# Patient Record
(demographics unavailable — no encounter records)

---

## 2024-10-07 NOTE — CONSULT LETTER
[Dear  ___] : Dear  [unfilled], [Consult Letter:] : I had the pleasure of evaluating your patient, [unfilled]. [Please see my note below.] : Please see my note below. [Sincerely,] : Sincerely, [FreeTextEntry3] :   Shiv Xiong MD, SUSAN, FACS Director of Surgical Oncology- [facility] , Department of Surgery [facility] at [place] [id number] Ph: [phone] Cell: [phone] [DrJu  ___] : Dr. ALVARADO

## 2024-10-07 NOTE — REASON FOR VISIT
[Follow-Up Visit] : a follow-up visit for [Initial Consultation] : an initial consultation for [FreeTextEntry2] : Gallbladder mass

## 2024-10-07 NOTE — PHYSICAL EXAM
[FreeTextEntry1] :   COVID -19 precautions as per [facility] policy was universally followed. [Normal] : supple, no neck mass and thyroid not enlarged [Normal Neck Lymph Nodes] : normal neck lymph nodes  [Normal Supraclavicular Lymph Nodes] : normal supraclavicular lymph nodes [Normal Groin Lymph Nodes] : normal groin lymph nodes [Normal Axillary Lymph Nodes] : normal axillary lymph nodes [Normal] : oriented to person, place and time, with appropriate affect [de-identified] : Abdomen soft, nontender, nondistended, no palpable masses.

## 2024-10-07 NOTE — ASSESSMENT
[FreeTextEntry1] : 83y/o female referred by Dr. Blood, with concerns of new lesion abutting to the gallbladder and ascending colon seen on PET scan. S/P EGD/EUS- Unable to visualize mass seen on PET endosonographically. Cholelithiasis with stones measuring up to 1.5 cm and shadowing artifact. Esophageal candidiasis and small polyp on preceding EGD, biopsied. Path-   Acute esophagitis with fungal organisms consistent with Candida -   PAS stain for fungi is positive. -   Negative for intestinal metaplasia  PET 8/26/24- ([facility])1.  A hypermetabolic soft tissue lesion abutting to the gallbladder and ascending colon with intense FDG avidity is indeterminate, suspicious for a malignant neoplastic process. Suggest contrast CT or MRI for further evaluation. 2.  Diffuse linear activity along the esophagus from upper to midportion without soft tissue mass or esophageal dilatation is probably due to an inflammatory etiology. For clinical correlation. 3.  Extremely dilated gallbladder with thickened wall and gallstone.   Plan:    I have discussed the diagnosis, therapeutic plan and options with the patient at length. Patient expressed verbal understanding to proceed with proposed plan. All questions answered.

## 2024-10-07 NOTE — HISTORY OF PRESENT ILLNESS
[de-identified] : 83y/o female referred by Dr. Blood, with concerns of new lesion abutting to the gallbladder and ascending colon seen on PET scan.  PT c/o abdominal pain mild when eating fried food, dysphagia started 2023, weight loss of 100lbs in the past 2 years and has been followed by Dr. Blood for anemia. BP is elevated, pt asymptomatic. PCP managing HTN.   PET 24- ([facility])1.  A hypermetabolic soft tissue lesion abutting to the gallbladder and ascending colon with intense FDG avidity is indeterminate, suspicious for a malignant neoplastic process. Suggest contrast CT or MRI for further evaluation. 2.  Diffuse linear activity along the esophagus from upper to midportion without soft tissue mass or esophageal dilatation is probably due to an inflammatory etiology. For clinical correlation. 3.  Extremely dilated gallbladder with thickened wall and gallstone.   Pmhx: DM, HTN, low back pain, anemia Pshx: None Fhx:None Social Hx: Denies smoking, ETOH use or illicit drug use.   ECO PCP: JOSHUA Hastings   [address] [facility] [address] [place] [address] [address] Phone: [phone] Oncologist:Dr. Blood

## 2024-10-07 NOTE — END OF VISIT
How Severe Is Your Skin Lesion?: mild Has Your Skin Lesion Been Treated?: not been treated Is This A New Presentation, Or A Follow-Up?: Growth [Time Spent: ___ minutes] : I have spent [unfilled] minutes of time on the encounter which excludes teaching and separately reported services.

## 2024-10-23 NOTE — CONSULT LETTER
[Dear  ___] : Dear  [unfilled], [Consult Letter:] : I had the pleasure of evaluating your patient, [unfilled]. [Please see my note below.] : Please see my note below. [Sincerely,] : Sincerely, [DrJu  ___] : Dr. ALVARADO [DrJu ___] : Dr. ALVARADO [FreeTextEntry3] :   Shiv Xiong MD, SUSAN, FACS Director of Surgical Oncology- [facility] , Department of Surgery [facility] at [place] [id number] Ph: [phone] Cell: [phone]

## 2024-10-23 NOTE — ASSESSMENT
[FreeTextEntry1] : 83y/o female referred by Dr. Blood, with concerns of new lesion abutting to the gallbladder and ascending colon seen on PET scan. 9/24/24 EUS with Dr. Alexis-Unable to visualize mass seen on PET endosonographically. Cholelithiasis with stones measuring up to 1.5 cm and shadowing artifact. Esophageal candidiasis and small polyp on preceding EGD, biopsied. Path-Chronic inactive gastritis, mild, with mild foveolar hyperplasia.  Plan:  -Referred to Dr. Clarke for colonoscopy- lesion could be originating from the hepatic flexure of the colon. -RTO 3 weeks.   I have discussed the diagnosis, therapeutic plan and options with the patient at length. Patient expressed verbal understanding to proceed with proposed plan. All questions answered.

## 2024-10-23 NOTE — PHYSICAL EXAM
[Normal] : supple, no neck mass and thyroid not enlarged [Normal Neck Lymph Nodes] : normal neck lymph nodes  [Normal Supraclavicular Lymph Nodes] : normal supraclavicular lymph nodes [Normal Groin Lymph Nodes] : normal groin lymph nodes [Normal Axillary Lymph Nodes] : normal axillary lymph nodes [Normal] : oriented to person, place and time, with appropriate affect [FreeTextEntry1] :   COVID -19 precautions as per [facility] policy was universally followed. [de-identified] : Abdomen soft, nontender, nondistended, no palpable masses.

## 2024-10-23 NOTE — PHYSICAL EXAM
[Normal] : supple, no neck mass and thyroid not enlarged [Normal Neck Lymph Nodes] : normal neck lymph nodes  [Normal Supraclavicular Lymph Nodes] : normal supraclavicular lymph nodes [Normal Groin Lymph Nodes] : normal groin lymph nodes [Normal Axillary Lymph Nodes] : normal axillary lymph nodes [Normal] : oriented to person, place and time, with appropriate affect [FreeTextEntry1] :   COVID -19 precautions as per [facility] policy was universally followed. [de-identified] : Abdomen soft, nontender, nondistended, no palpable masses.

## 2024-10-23 NOTE — HISTORY OF PRESENT ILLNESS
[de-identified] : 85y/o female referred by Dr. Blood, with concerns of new lesion abutting to the gallbladder and ascending colon seen on PET scan.  PT c/o abdominal pain mild when eating fried food, dysphagia started 2023, weight loss of 100lbs in the past 2 years and has been followed by Dr. Blood for anemia. BP is elevated, pt asymptomatic. PCP managing HTN.   PET 24- ([facility])1.  A hypermetabolic soft tissue lesion abutting to the gallbladder and ascending colon with intense FDG avidity is indeterminate, suspicious for a malignant neoplastic process. Suggest contrast CT or MRI for further evaluation. 2.  Diffuse linear activity along the esophagus from upper to midportion without soft tissue mass or esophageal dilatation is probably due to an inflammatory etiology. For clinical correlation. 3.  Extremely dilated gallbladder with thickened wall and gallstone.   24 EUS with Dr. Alexis-Unable to visualize mass seen on PET endosonographically. Cholelithiasis with stones measuring up to 1.5 cm and shadowing artifact. Esophageal candidiasis and small polyp on preceding EGD, biopsied. Path-Chronic inactive gastritis, mild, with mild foveolar hyperplasia.  10/23/24- Pt reports feeling exhausted, has been going to multiple appointments. Skipped BP medication this morning, as she was rushing to get ride from access ride. C/o intermitted abdominal pain 5/10 after she eats from left lower quadrant and radiates to right lower quadrant. BM daily dark in color as pt is taking iron tablets. Stating 80lbs weight loss in the last 4 years (since covid).  BP retaken 170/90   Pmhx: DM, HTN, low back pain, anemia Pshx: None Fhx: None Social Hx: Denies smoking, ETOH use or illicit drug use.   ECO PCP: JOSHUA Hastings   [address] [facility] [address] [place], [place] [address] Phone: [phone] Oncologist:Dr. Blood

## 2024-10-23 NOTE — ASSESSMENT
[FreeTextEntry1] : 83y/o female referred by Dr. Blood, with concerns of new lesion abutting to the gallbladder and ascending colon seen on PET scan. 9/24/24 EUS with Dr. Aelxis-Unable to visualize mass seen on PET endosonographically. Cholelithiasis with stones measuring up to 1.5 cm and shadowing artifact. Esophageal candidiasis and small polyp on preceding EGD, biopsied. Path-Chronic inactive gastritis, mild, with mild foveolar hyperplasia.  Plan:  -Referred to Dr. Clarke for colonoscopy- lesion could be originating from the hepatic flexure of the colon. -RTO 3 weeks.   I have discussed the diagnosis, therapeutic plan and options with the patient at length. Patient expressed verbal understanding to proceed with proposed plan. All questions answered.

## 2024-10-23 NOTE — CONSULT LETTER
[Dear  ___] : Dear  [unfilled], [Consult Letter:] : I had the pleasure of evaluating your patient, [unfilled]. [Please see my note below.] : Please see my note below. [Sincerely,] : Sincerely, [DrJu  ___] : Dr. ALVARADO [DrJu ___] : Dr. ALVARADO [FreeTextEntry3] :   Shiv Xiong MD, SUSAN, FACS Director of Surgical Oncology- [facility] , Department of Surgery [facility] at [place] [id number] Ph: [phone] Cell: [phone]

## 2024-10-23 NOTE — HISTORY OF PRESENT ILLNESS
[de-identified] : 83y/o female referred by Dr. Blood, with concerns of new lesion abutting to the gallbladder and ascending colon seen on PET scan.  PT c/o abdominal pain mild when eating fried food, dysphagia started 2023, weight loss of 100lbs in the past 2 years and has been followed by Dr. Blood for anemia. BP is elevated, pt asymptomatic. PCP managing HTN.   PET 24- ([facility])1.  A hypermetabolic soft tissue lesion abutting to the gallbladder and ascending colon with intense FDG avidity is indeterminate, suspicious for a malignant neoplastic process. Suggest contrast CT or MRI for further evaluation. 2.  Diffuse linear activity along the esophagus from upper to midportion without soft tissue mass or esophageal dilatation is probably due to an inflammatory etiology. For clinical correlation. 3.  Extremely dilated gallbladder with thickened wall and gallstone.   24 EUS with Dr. Alexis-Unable to visualize mass seen on PET endosonographically. Cholelithiasis with stones measuring up to 1.5 cm and shadowing artifact. Esophageal candidiasis and small polyp on preceding EGD, biopsied. Path-Chronic inactive gastritis, mild, with mild foveolar hyperplasia.  10/23/24- Pt reports feeling exhausted, has been going to multiple appointments. Skipped BP medication this morning, as she was rushing to get ride from access ride. C/o intermitted abdominal pain 5/10 after she eats from left lower quadrant and radiates to right lower quadrant. BM daily dark in color as pt is taking iron tablets. Stating 80lbs weight loss in the last 4 years (since covid).  BP retaken 170/90   Pmhx: DM, HTN, low back pain, anemia Pshx: None Fhx: None Social Hx: Denies smoking, ETOH use or illicit drug use.   ECO PCP: JOSHUA Hastings   [address] [facility] [address] [place], [place] [address] Phone: [phone] Oncologist:Dr. Blood

## 2024-11-13 NOTE — PHYSICAL EXAM
[Normal] : supple, no neck mass and thyroid not enlarged [Normal Neck Lymph Nodes] : normal neck lymph nodes  [Normal Supraclavicular Lymph Nodes] : normal supraclavicular lymph nodes [Normal Groin Lymph Nodes] : normal groin lymph nodes [Normal Axillary Lymph Nodes] : normal axillary lymph nodes [Normal] : oriented to person, place and time, with appropriate affect [FreeTextEntry1] :   COVID -19 precautions as per [facility] policy was universally followed. [de-identified] : Abdomen soft, nontender, nondistended, no palpable masses.

## 2024-11-13 NOTE — ASSESSMENT
[FreeTextEntry1] : 85y/o female referred by Dr. Blood, with concerns of new lesion abutting to the gallbladder and ascending colon seen on PET scan. 9/24/24 EUS with Dr. Alexis-Unable to visualize mass seen on PET endosonographically. Cholelithiasis with stones measuring up to 1.5 cm and shadowing artifact. Esophageal candidiasis and small polyp on preceding EGD, biopsied. Path-Chronic inactive gastritis, mild, with mild foveolar hyperplasia.  S/p Colonoscopy 11/06/2024 with Dr. Clarke- Path- 1.  Cecal polyp, polypectomy -   Tubular adenoma 2.  Cecal polyp #2, polypectomy -   Colonic mucosa with hyperplastic change 3.  Sigmoid, polypectomy -   Villotubular adenoma 4. and 5. Hepatic flexure, polyp 1 and 2, polypectomy -   Tubular adenomas 6., 7 and 8. Ascending colon, polyp 1, 2  and 3, polypectomy: -   Tubular adenomas 9.  Ascending colon, polyp number 4, polypectomy -   Villotubular adenoma, fragments   Plan:  -Cont follow up with Lizette Clarke and Caitie -RTO 3 weeks.   I have discussed the diagnosis, therapeutic plan and options with the patient at length. Patient expressed verbal understanding to proceed with proposed plan. All questions answered.

## 2024-11-13 NOTE — CONSULT LETTER
[Dear  ___] : Dear  [unfilled], [Consult Letter:] : I had the pleasure of evaluating your patient, [unfilled]. [Please see my note below.] : Please see my note below. [Sincerely,] : Sincerely, [DrJu  ___] : Dr. ALVARADO [DrJu ___] : Dr. ALVARADO [FreeTextEntry3] :   Shiv Xiong MD, SUSAN, FACS Director of Surgical Oncology- [facility] , Department of Surgery [facility] at [place] [id number] Ph: [phone] Cell: [phone]

## 2024-11-13 NOTE — HISTORY OF PRESENT ILLNESS
[de-identified] : 85y/o female referred by Dr. Blood, with concerns of new lesion abutting to the gallbladder and ascending colon seen on PET scan.  PT c/o abdominal pain mild when eating fried food, dysphagia started 2023, weight loss of 100lbs in the past 2 years and has been followed by Dr. Blood for anemia. BP is elevated, pt asymptomatic. PCP managing HTN.   PET 24- ([facility])1.  A hypermetabolic soft tissue lesion abutting to the gallbladder and ascending colon with intense FDG avidity is indeterminate, suspicious for a malignant neoplastic process. Suggest contrast CT or MRI for further evaluation. 2.  Diffuse linear activity along the esophagus from upper to midportion without soft tissue mass or esophageal dilatation is probably due to an inflammatory etiology. For clinical correlation. 3.  Extremely dilated gallbladder with thickened wall and gallstone.   24 EUS with Dr. Alexis-Unable to visualize mass seen on PET endosonographically. Cholelithiasis with stones measuring up to 1.5 cm and shadowing artifact. Esophageal candidiasis and small polyp on preceding EGD, biopsied. Path-Chronic inactive gastritis, mild, with mild foveolar hyperplasia.  10/23/24- Pt reports feeling exhausted, has been going to multiple appointments. Skipped BP medication this morning, as she was rushing to get ride from access ride. C/o intermitted abdominal pain 5/10 after she eats from left lower quadrant and radiates to right lower quadrant. BM daily dark in color as pt is taking iron tablets. Stating 80lbs weight loss in the last 4 years (since covid).  BP retaken 170/90   S/p Colonoscopy 2024 with Dr. Clarke- Path- 1.  Cecal polyp, polypectomy -   Tubular adenoma 2.  Cecal polyp #2, polypectomy -   Colonic mucosa with hyperplastic change 3.  Sigmoid, polypectomy -   Villotubular adenoma 4. and 5. Hepatic flexure, polyp 1 and 2, polypectomy -   Tubular adenomas 6., 7 and 8. Ascending colon, polyp 1, 2  and 3, polypectomy: -   Tubular adenomas 9.  Ascending colon, polyp number 4, polypectomy -   Villotubular adenoma, fragments  2024- Feels well, tolerating diet, denies nausea, vomiting, diarrhea, hematochezia or steatorrhea, and unintentional weight loss at this time.  Pmhx: DM, HTN, low back pain, anemia Pshx: None Fhx: None Social Hx: Denies smoking, ETOH use or illicit drug use.   ECO PCP: JOSHUA Hastings   [address] [address] Phone: [phone] Oncologist:Dr. Blood

## 2024-11-13 NOTE — HISTORY OF PRESENT ILLNESS
[de-identified] : 85y/o female referred by Dr. Blood, with concerns of new lesion abutting to the gallbladder and ascending colon seen on PET scan.  PT c/o abdominal pain mild when eating fried food, dysphagia started 2023, weight loss of 100lbs in the past 2 years and has been followed by Dr. Blood for anemia. BP is elevated, pt asymptomatic. PCP managing HTN.   PET 24- ([facility])1.  A hypermetabolic soft tissue lesion abutting to the gallbladder and ascending colon with intense FDG avidity is indeterminate, suspicious for a malignant neoplastic process. Suggest contrast CT or MRI for further evaluation. 2.  Diffuse linear activity along the esophagus from upper to midportion without soft tissue mass or esophageal dilatation is probably due to an inflammatory etiology. For clinical correlation. 3.  Extremely dilated gallbladder with thickened wall and gallstone.   24 EUS with Dr. Alexis-Unable to visualize mass seen on PET endosonographically. Cholelithiasis with stones measuring up to 1.5 cm and shadowing artifact. Esophageal candidiasis and small polyp on preceding EGD, biopsied. Path-Chronic inactive gastritis, mild, with mild foveolar hyperplasia.  10/23/24- Pt reports feeling exhausted, has been going to multiple appointments. Skipped BP medication this morning, as she was rushing to get ride from access ride. C/o intermitted abdominal pain 5/10 after she eats from left lower quadrant and radiates to right lower quadrant. BM daily dark in color as pt is taking iron tablets. Stating 80lbs weight loss in the last 4 years (since covid).  BP retaken 170/90   S/p Colonoscopy 2024 with Dr. Clarke- Path- 1.  Cecal polyp, polypectomy -   Tubular adenoma 2.  Cecal polyp #2, polypectomy -   Colonic mucosa with hyperplastic change 3.  Sigmoid, polypectomy -   Villotubular adenoma 4. and 5. Hepatic flexure, polyp 1 and 2, polypectomy -   Tubular adenomas 6., 7 and 8. Ascending colon, polyp 1, 2  and 3, polypectomy: -   Tubular adenomas 9.  Ascending colon, polyp number 4, polypectomy -   Villotubular adenoma, fragments  2024- Feels well, tolerating diet, denies nausea, vomiting, diarrhea, hematochezia or steatorrhea, and unintentional weight loss at this time.  Pmhx: DM, HTN, low back pain, anemia Pshx: None Fhx: None Social Hx: Denies smoking, ETOH use or illicit drug use.   ECO PCP: JOSHUA Hastings   [address] [address] Phone: [phone] Oncologist:Dr. Blood

## 2024-11-13 NOTE — PHYSICAL EXAM
02:24 PM       CMP:   Lab Results   Component Value Date/Time     06/08/2023 09:09 AM    K 4.1 06/08/2023 09:09 AM     06/08/2023 09:09 AM    CO2 27 06/08/2023 09:09 AM    BUN 13 06/08/2023 09:09 AM    CREATININE 1.1 06/08/2023 09:09 AM    GFRAA 53 10/17/2022 10:11 AM    AGRATIO 2.32 03/28/2023 02:25 PM    LABGLOM 51 06/08/2023 09:09 AM    GLUCOSE 108 06/08/2023 09:09 AM    PROT 6.1 03/28/2023 02:25 PM    PROT 6.30 03/28/2023 02:25 PM    CALCIUM 10.6 06/08/2023 09:09 AM    BILITOT 0.4 03/28/2023 02:25 PM    ALKPHOS 69 03/28/2023 02:25 PM    AST 17 03/28/2023 02:25 PM    ALT 11 03/28/2023 02:25 PM       POC Tests: No results for input(s): \"POCGLU\", \"POCNA\", \"POCK\", \"POCCL\", \"POCBUN\", \"POCHEMO\", \"POCHCT\" in the last 72 hours.     Coags: No results found for: \"PROTIME\", \"INR\", \"APTT\"    HCG (If Applicable): No results found for: \"PREGTESTUR\", \"PREGSERUM\", \"HCG\", \"HCGQUANT\"     ABGs: No results found for: \"PHART\", \"PO2ART\", \"UKP9XYQ\", \"ZVF4TWM\", \"BEART\", \"W7IMUVWB\"     Type & Screen (If Applicable):  No results found for: \"LABABO\", \"LABRH\"    Drug/Infectious Status (If Applicable):  No results found for: \"HIV\", \"HEPCAB\"    COVID-19 Screening (If Applicable):   Lab Results   Component Value Date/Time    COVID19 Not Detected 04/19/2022 01:35 PM           Anesthesia Evaluation  Patient summary reviewed no history of anesthetic complications:   Airway: Mallampati: II  TM distance: >3 FB   Neck ROM: full  Mouth opening: > = 3 FB   Dental: normal exam         Pulmonary:   (+) asthma (mild):     (-) sleep apnea and not a current smoker                           Cardiovascular:    (+) hypertension:, hyperlipidemia    (-) past MI, CABG/stent and  angina       Beta Blocker:  Dose within 24 Hrs      ROS comment: ECHO 2021: EF 55%     Neuro/Psych:   (+) neuromuscular disease (fibromyalgia):,    (-) seizures and CVA           GI/Hepatic/Renal:   (+) GERD:, renal disease (CKD2): CRI,      (-) liver disease [Normal] : supple, no neck mass and thyroid not enlarged [Normal Neck Lymph Nodes] : normal neck lymph nodes  [Normal Supraclavicular Lymph Nodes] : normal supraclavicular lymph nodes [Normal Groin Lymph Nodes] : normal groin lymph nodes [Normal Axillary Lymph Nodes] : normal axillary lymph nodes [Normal] : oriented to person, place and time, with appropriate affect [FreeTextEntry1] :   COVID -19 precautions as per [facility] policy was universally followed. [de-identified] : Abdomen soft, nontender, nondistended, no palpable masses.

## 2024-11-13 NOTE — PHYSICAL EXAM
Borderline controlled, continue current medications, continue active lifestyle and seen specialists as advised [Normal] : supple, no neck mass and thyroid not enlarged [Normal Neck Lymph Nodes] : normal neck lymph nodes  [Normal Supraclavicular Lymph Nodes] : normal supraclavicular lymph nodes [Normal Groin Lymph Nodes] : normal groin lymph nodes [Normal Axillary Lymph Nodes] : normal axillary lymph nodes [Normal] : oriented to person, place and time, with appropriate affect [FreeTextEntry1] :   COVID -19 precautions as per [facility] policy was universally followed. [de-identified] : Abdomen soft, nontender, nondistended, no palpable masses.

## 2024-11-13 NOTE — CONSULT LETTER
[Dear  ___] : Dear  [unfilled], [Consult Letter:] : I had the pleasure of evaluating your patient, [unfilled]. [Please see my note below.] : Please see my note below. [Sincerely,] : Sincerely, [DrJu  ___] : Dr. ALVARADO [DrJu ___] : Dr. ALVARADO [FreeTextEntry3] :   Shiv Xiong MD, SUSAN, FACS Director of Surgical Oncology- [facility] , Department of Surgery [facility] at [place] [id number] Ph: [phone] Cell: [phone]

## 2024-11-13 NOTE — CONSULT LETTER
[Dear  ___] : Dear  [unfilled], [Consult Letter:] : I had the pleasure of evaluating your patient, [unfilled]. [Please see my note below.] : Please see my note below. [Sincerely,] : Sincerely, [DrJu  ___] : Dr. ALVARADO [DrJu ___] : Dr. ALVARADO [FreeTextEntry3] :   Shiv Xiong MD, SUSAN, FACS Director of Surgical Oncology- [facility] , Department of Surgery [facility] at [place] [id number] Ph: [phone] Cell: [phone]

## 2024-11-13 NOTE — HISTORY OF PRESENT ILLNESS
[de-identified] : 85y/o female referred by Dr. Blood, with concerns of new lesion abutting to the gallbladder and ascending colon seen on PET scan.  PT c/o abdominal pain mild when eating fried food, dysphagia started 2023, weight loss of 100lbs in the past 2 years and has been followed by Dr. Blood for anemia. BP is elevated, pt asymptomatic. PCP managing HTN.   PET 24- ([facility])1.  A hypermetabolic soft tissue lesion abutting to the gallbladder and ascending colon with intense FDG avidity is indeterminate, suspicious for a malignant neoplastic process. Suggest contrast CT or MRI for further evaluation. 2.  Diffuse linear activity along the esophagus from upper to midportion without soft tissue mass or esophageal dilatation is probably due to an inflammatory etiology. For clinical correlation. 3.  Extremely dilated gallbladder with thickened wall and gallstone.   24 EUS with Dr. Alexis-Unable to visualize mass seen on PET endosonographically. Cholelithiasis with stones measuring up to 1.5 cm and shadowing artifact. Esophageal candidiasis and small polyp on preceding EGD, biopsied. Path-Chronic inactive gastritis, mild, with mild foveolar hyperplasia.  10/23/24- Pt reports feeling exhausted, has been going to multiple appointments. Skipped BP medication this morning, as she was rushing to get ride from access ride. C/o intermitted abdominal pain 5/10 after she eats from left lower quadrant and radiates to right lower quadrant. BM daily dark in color as pt is taking iron tablets. Stating 80lbs weight loss in the last 4 years (since covid).  BP retaken 170/90   S/p Colonoscopy 2024 with Dr. Clarke- Path- 1.  Cecal polyp, polypectomy -   Tubular adenoma 2.  Cecal polyp #2, polypectomy -   Colonic mucosa with hyperplastic change 3.  Sigmoid, polypectomy -   Villotubular adenoma 4. and 5. Hepatic flexure, polyp 1 and 2, polypectomy -   Tubular adenomas 6., 7 and 8. Ascending colon, polyp 1, 2  and 3, polypectomy: -   Tubular adenomas 9.  Ascending colon, polyp number 4, polypectomy -   Villotubular adenoma, fragments  2024- Feels well, tolerating diet, denies nausea, vomiting, diarrhea, hematochezia or steatorrhea, and unintentional weight loss at this time.  Pmhx: DM, HTN, low back pain, anemia Pshx: None Fhx: None Social Hx: Denies smoking, ETOH use or illicit drug use.   [facility] PCP: JOSHUA Hastings   [address] [address] Phone: [phone] Oncologist:Dr. Blood

## 2024-11-13 NOTE — ASSESSMENT
[FreeTextEntry1] : 83y/o female referred by Dr. Blood, with concerns of new lesion abutting to the gallbladder and ascending colon seen on PET scan. 9/24/24 EUS with Dr. Alexis-Unable to visualize mass seen on PET endosonographically. Cholelithiasis with stones measuring up to 1.5 cm and shadowing artifact. Esophageal candidiasis and small polyp on preceding EGD, biopsied. Path-Chronic inactive gastritis, mild, with mild foveolar hyperplasia.  S/p Colonoscopy 11/06/2024 with Dr. Clarke- Path- 1.  Cecal polyp, polypectomy -   Tubular adenoma 2.  Cecal polyp #2, polypectomy -   Colonic mucosa with hyperplastic change 3.  Sigmoid, polypectomy -   Villotubular adenoma 4. and 5. Hepatic flexure, polyp 1 and 2, polypectomy -   Tubular adenomas 6., 7 and 8. Ascending colon, polyp 1, 2  and 3, polypectomy: -   Tubular adenomas 9.  Ascending colon, polyp number 4, polypectomy -   Villotubular adenoma, fragments   Plan:  -Cont follow up with Lizette Clarke and Caitie -RTO 3 weeks.   I have discussed the diagnosis, therapeutic plan and options with the patient at length. Patient expressed verbal understanding to proceed with proposed plan. All questions answered.

## 2025-01-13 NOTE — HISTORY OF PRESENT ILLNESS
[FreeTextEntry1] : Admitted to the hospital and discharged less than 10 days ago.  She was discovered to have suffered a right occipital stroke with hemorrhage.  Apixaban that had been used for peripheral vascular disease was discontinued and she was placed on aspirin.  She is supposed to have physical therapy occupational therapy at home but she has not yet received the home visit.  She is compliant with her medications.  She was here with a friend who is helping her.  Before she suffered the stroke she used to do her shopping, she was independent with laundry cooking cleaning in her house and visiting going out of the house.  Now, all of these activities are restricted and she depends on others.

## 2025-01-13 NOTE — DATA REVIEWED
[de-identified] : ACC: [id number] EXAM: MR BRAIN WAW IC ORDERED BY: IDRIS WALL  PROCEDURE DATE: 12/31/2024    INTERPRETATION: CLINICAL INDICATION: Evaluate for hemorrhage suggested on CT   Magnetic resonance imaging of the brain was carried out with transaxial SPGR, FLAIR, fast spin echo T2 weighted images, axial susceptibility weighted series, diffusion weighted series and sagittal T1 weighted series on a 1.5 Anita magnet. Post contrast axial, coronal and sagittal T1 weighted images were obtained. 7.5 cc of Gadavist were intravenously injected, 0 cc were discarded.  Comparison is made with the prior CT of 12/30/2024 and 12/31/2024.  Ventricular and sulcal prominence is consistent with age-appropriate change. Small vessel white matter ischemic changes are noted. There is an acute infarct in the right occipital lobe infarct in the right posterior cerebral artery distribution. Susceptibility weighted series demonstrates blooming of acute hemorrhage which is seen as slight heterogeneity on the noncontrast CT. There is local sulcal effacement. There is mild enhancement.  A right posterior temporal white mass identified consistent with a calcified meningioma measuring 1.8 cm in AP diameter by 2.2 cm transversely by 1.2 cm in craniocaudal diameter without significant enhancement.  The sellar and parasellar structures are unremarkable.  Paranasal sinuses are clear. There has been left-sided lens replacement surgery.  IMPRESSION: Age-appropriate involutional and ischemic gliotic changes. Hemorrhagic likely enhancing right posterior cerebral artery infarct in the temporal occipital region consistent with a subacute infarct.  Right posterior temporal extra-axial calcified mass without enhancement consistent with a meningioma.  --- End of Report ---      REY CADET MD; Attending Radiologist This document has been electronically signed. Dec 31 2024 5:16PM

## 2025-01-13 NOTE — PHYSICAL EXAM
[Person] : oriented to person [Place] : oriented to place [Time] : oriented to time [Short Term Intact] : short term memory impaired [Concentration Intact] : normal concentrating ability [Naming Objects] : no difficulty naming common objects [Repeating Phrases] : no difficulty repeating a phrase [Writing A Sentence] : no difficulty writing a sentence [Fluency] : fluency intact [Comprehension] : comprehension intact [Cranial Nerves Trigeminal (V)] : facial sensation intact symmetrically [Cranial Nerves Facial (VII)] : face symmetrical [Cranial Nerves Glossopharyngeal (IX)] : tongue and palate midline [Cranial Nerves Accessory (XI - Cranial And Spinal)] : head turning and shoulder shrug symmetric [Cranial Nerves Hypoglossal (XII)] : there was no tongue deviation with protrusion [Hemianopsia Homonymous Left] : left homonymous hemianopsia present [PERRLA] : pupils equal in size, round, reactive to light, with normal accommodation [EOM Intact] : extraocular movements intact [Motor Strength] : muscle strength was normal in all four extremities [Motor Strength Lower Extremities Bilaterally] : there was weakness in both lower extremities [2] : great toe flexion 2/5 [Limited Balance] : the patient's balance was impaired [2+] : Triceps left 2+ [1+] : Patella left 1+ [0] : Ankle jerk left 0 [FreeTextEntry8] : Assisted gait [Edema] : there was no peripheral edema [FreeTextEntry1] : Absent pulses in both lower extremities with pigmented thickened skin that is tender.  Pregangrenous.

## 2025-01-26 NOTE — REASON FOR VISIT
[FreeTextEntry1] : Ms. Layton presents to the office today for an initial cardiovascular evaluation following a recent hospital admission.

## 2025-01-26 NOTE — PHYSICAL EXAM
[Normal Conjunctiva] : normal conjunctiva [No Xanthelasma] : no xanthelasma [Normal Venous Pressure] : normal venous pressure [Normal] : clear lung fields, good air entry, no respiratory distress [Soft] : abdomen soft [Non Tender] : non-tender [Normal Bowel Sounds] : normal bowel sounds [No Edema] : no edema [No Cyanosis] : no cyanosis [No Clubbing] : no clubbing [No Rash] : no rash [Moves all extremities] : moves all extremities [Alert and Oriented] : alert and oriented [de-identified] : Gait not examined.

## 2025-01-26 NOTE — DISCUSSION/SUMMARY
[EKG obtained to assist in diagnosis and management of assessed problem(s)] : EKG obtained to assist in diagnosis and management of assessed problem(s) [FreeTextEntry1] : In summary, Ms. Layton is a 85 year old female with a medical history significant for hypertension, hyperlipidemia, atrial fibrillation, diabetes mellitus, recent cerebrovascular accident, colon polyps, peripheral vascular disease, cholelithiasis, esophageal candidiasis, and a gallbladder mass of uncertain etiology.  She has previously undergone a hysterectomy, lipoma resection, colonoscopy with polyp resection, and endoscopic ultrasound (EUS).  Ms. Layotn was recently admitted to [facility] with episodes of confusion, and was ultimately diagnosed with a right temporal/occipital stroke with hemorrhagic transformation.  Her Eliquis was discontinued in light of the cerebral hemorrhage, but she was continued on aspirin.  Ms. Layton has followed up in Neurology and the plan will be for her to continue taking aspirin 81 mg/day for now.  However, the aspirin will be discontinued once her Eliquis is resumed (the Eliquis will tentatively be resumed on January 30th, 2025).  Ms. Layton remains in atrial fibrillation but appears to be minimally symptomatic from the arrhythmia at this time.  In addition, the atrial fibrillation appears to be well rate controlled without specific beta blocker therapy.  A beta blocker can be introduced should there be evidence of symptoms and/or rapid ventricular response to the atrial fibrillation.  If it is ultimately deemed too risky for Ms. Layton to return to taking therapeutic anticoagulation in light of her recent hemorrhagic stroke, consideration can be given to referring her for transcatheter closure of the left atrial appendage (MARY LOU), for example with the Watchman device.  If Ms. Layton were to develop symptoms related to the atrial fibrillation, consideration can be given to proceeding with a ISIDRO-guided DC electrical cardioversion out of atrial fibrillation.  Her blood pressure appears to be under reasonable control on her current medical regimen.  Ms. Layton will return to the office for a follow up visit in approximately three months.  In the meantime she will follow up in Neurology and Vascular Surgery.

## 2025-01-26 NOTE — HISTORY OF PRESENT ILLNESS
[FreeTextEntry1] : Ms. Layton presents to the office today for an initial cardiovascular evaluation following a recent hospital admission.  Ms. Layton is a 85 year old female with a medical history significant for hypertension, hyperlipidemia, atrial fibrillation, diabetes mellitus, cerebrovascular accident (see below), colon polyps, peripheral vascular disease, cholelithiasis, esophageal candidiasis, and a gallbladder mass of uncertain etiology.  She has previously undergone a hysterectomy, lipoma resection, colonoscopy with polyp resection, and endoscopic ultrasound (EUS).  Ms. Layton underwent a colonoscopy on 11/6/2024.  Multiple colon polyps were found at the time of the examination.  Ms. Layton underwent an endoscopic ultrasound (EUS) on 9/24/2024.  This was performed for further evaluation of a PET scan that had demonstrated a new lesion abutting the gallbladder and ascending colon.  The PET scan had also demonstrated a severely dilated gallbladder with thickened walls and gallstones.  In summary, the EUS demonstrated multiple white adherent plaques throughout the esophagus consistent with esophageal candidiasis.  A small hiatus hernia was identified.  Mild erythema in the antrum and incisura.  The duodenal bulb and 2nd portion of the duodenum were normal.  There were multiple gallstones seen, measuring up to 15 mm in dimension.  The travon-gallbladder mass previously visualized on PET scan was not identified at the time of the EUS examination.  Ms. Layton was admitted to [facility] on December 31st, 2024 with episodes of confusion.  She was found to have a hemorrhagic stroke involving the right temporal/occipital region, likely involving the right posterior cerebral artery.  Ms. Layton was initially admitted to the ICU for monitoring and remained hemodynamically and neurologically stable.  Ms. Layton's aspirin and Eliquis were discontinued due to the hemorrhagic nature of the cerebrovascular accident.    On telemetry monitoring, Ms. Layton was found to have multiple episodes of atrial fibrillation in addition to episodes of asymptomatic bradycardia.  Neurology evaluated the patient and felt that the stroke was likely to be embolic in nature, leading to the recommendation that Eliquis be restarted in approximately 4-6 weeks (end of January 2025).  Ms. Layton was kept on aspirin with the plan to discontinue this medication once the Eliquis is restarted.  Ms. Layton is accompanied by a friend at today's office visit.  Ms. Layton reports that she has been feeling generally well since being discharged from the hospital.  She continues to live alone at home.  She has been receiving physical therapy at home twice per week.  Ms. Layton has been able to ambulate slowly with a walker or cane.  She experiences occasional exertional dyspnea but no dyspnea at rest.  There has been no chest pain either at rest or with exertion.  Ms. Layton experiences occasional palpitations but denies having any presyncope or syncope.  There has been no orthopnea or paroxysmal nocturnal dyspnea.  Ms. Layton notes occasional lower extremity edema.  Ms. Layton reports that she is awaiting an appointment in Vascular Surgery for evaluation of ischemic left foot pain.  A transthoracic echocardiogram was performed during Ms. Layton's hospital admission (on 1/2/2025).  In summary, this demonstrated severe left ventricular hypertrophy.  Left ventricular systolic function is normal, with a visually estimated LVEF of 55-60%.  Right ventricular size and systolic function were normal.  There was minimal mitral regurgitation.  The aortic valve is trileaflet with normal opening.  There is mild aortic regurgitation.  The left atrium is severely dilated.  There is mild pulmonary hypertension, with an estimated pulmonary artery systolic pressure of 41 mmHg.

## 2025-01-26 NOTE — CARDIOLOGY SUMMARY
[de-identified] : 1/24/2025:  Atrial fibrillation with an average ventricular response of 63/minute; anteroseptal infarct, age undetermined; nonspecific ST/T abnormalities.

## 2025-05-01 NOTE — HISTORY OF PRESENT ILLNESS
[FreeTextEntry1] : Admitted to the hospital and discharged less than 10 days ago. She was discovered to have suffered a right occipital stroke with hemorrhage. Apixaban that had been used for peripheral vascular disease was discontinued and she was placed on aspirin. She is supposed to have physical therapy and occupational therapy at home, but she has not yet received the home visit. She is compliant with her medications. She was here with a friend who is helping her.  Before she suffered the stroke she used to do her shopping, she was independent with laundry cooking cleaning in her house and visiting going out of the house. Now, all of these activities are restricted, and she depends on others.  4/30/2025 - 84 y/o, f, PMHx of arthritis, DMII, Hyperlipidemia and PVD presents with progressive memory problems that began after she suffered a stroke in December 2024. She forgets where she keeps her keys and wallet, forgets events that may have occurred recently. Finds outpatient physician therapy to be very effective.  She is using apixaban, as well as Plavix.  Her other medications are alendronate, atorvastatin, lisinopril, Norvasc, escitalopram, glipizide, latanoprost eyedrops, vitamin D3.

## 2025-05-01 NOTE — REVIEW OF SYSTEMS
[Memory Lapses or Loss] : memory loss [Decr. Concentrating Ability] : decreased concentrating ability [Difficulty with Language] : ~M difficulty with language [Changed Thought Patterns] : changed thought patterns [Facial Weakness] : no facial weakness [Arm Weakness] : no arm weakness [Hand Weakness] : no hand weakness [Leg Weakness] : no leg weakness [Poor Coordination] : good coordination

## 2025-05-01 NOTE — PHYSICAL EXAM
[___ / 5] : Visuospatial / Executive: [unfilled] / 5 [0 / 0] : Memory: 0 / 0 [___ / 3] : Attention (Serial 7 subtraction): [unfilled] / 3 [___ / 1] : Fluency: [unfilled] / 1 [___ / 2] : Abstraction: [unfilled] / 2 [___ / 5] : Delayed Recall: [unfilled] / 5 [___ / 6] : Orientation: [unfilled] / 6 [Sclera] : the sclera and conjunctiva were normal [PERRL With Normal Accommodation] : pupils were equal in size, round, reactive to light, with normal accommodation [Extraocular Movements] : extraocular movements were intact [Outer Ear] : the ears and nose were normal in appearance [Oropharynx] : the oropharynx was normal [Neck Appearance] : the appearance of the neck was normal [Neck Cervical Mass (___cm)] : no neck mass was observed [Jugular Venous Distention Increased] : there was no jugular-venous distention [Thyroid Diffuse Enlargement] : the thyroid was not enlarged [Thyroid Nodule] : there were no palpable thyroid nodules [] : no respiratory distress [Auscultation Breath Sounds / Voice Sounds] : lungs were clear to auscultation bilaterally [Heart Rate And Rhythm] : heart rate was normal and rhythm regular [Heart Sounds] : normal S1 and S2 [Murmurs] : no murmurs [Heart Sounds Gallop] : no gallops [Heart Sounds Pericardial Friction Rub] : no pericardial rub [Full Pulse] : the pedal pulses are present [Edema] : there was no peripheral edema [MocaTotal] : 19

## 2025-05-01 NOTE — PHYSICAL EXAM
Medication: AmLODIPine  passed protocol.   Last office visit date: 8/20/24  Next appointment scheduled?: No;    Number of refills given: 0   [___ / 5] : Visuospatial / Executive: [unfilled] / 5 [0 / 0] : Memory: 0 / 0 [___ / 3] : Attention (Serial 7 subtraction): [unfilled] / 3 [___ / 1] : Fluency: [unfilled] / 1 [___ / 2] : Abstraction: [unfilled] / 2 [___ / 5] : Delayed Recall: [unfilled] / 5 [___ / 6] : Orientation: [unfilled] / 6 [Sclera] : the sclera and conjunctiva were normal [PERRL With Normal Accommodation] : pupils were equal in size, round, reactive to light, with normal accommodation [Extraocular Movements] : extraocular movements were intact [Outer Ear] : the ears and nose were normal in appearance [Oropharynx] : the oropharynx was normal [Neck Appearance] : the appearance of the neck was normal [Jugular Venous Distention Increased] : there was no jugular-venous distention [Neck Cervical Mass (___cm)] : no neck mass was observed [Thyroid Diffuse Enlargement] : the thyroid was not enlarged [Thyroid Nodule] : there were no palpable thyroid nodules [] : no respiratory distress [Auscultation Breath Sounds / Voice Sounds] : lungs were clear to auscultation bilaterally [Heart Rate And Rhythm] : heart rate was normal and rhythm regular [Heart Sounds] : normal S1 and S2 [Heart Sounds Gallop] : no gallops [Murmurs] : no murmurs [Heart Sounds Pericardial Friction Rub] : no pericardial rub [Full Pulse] : the pedal pulses are present [Edema] : there was no peripheral edema [MocaTotal] : 19

## 2025-05-01 NOTE — DISCUSSION/SUMMARY
[FreeTextEntry1] : He I have reviewed the MRI scan images of the brain t performed in [facility] in December 2024.  It does show extensive high intensity periventricular T2 signals consistent with massive microvascular ischemia as well as encephalomalacia in the right temporal lobe.  This combination itself could be responsible for her memory problems.  However, to investigate other potentially treatable forms of dementia , appropriate lab tests serology, PET scan and considered LP.  Because of her age her family may not wish her to have the LP.  Because of her risk of malignancy, and seizures complicating the story of loss of memory, EEG will be done.  I have reviewed the whole-body PET scan.  It does not show foci suspicious for malignancy. Recommend supplemental therapy for vitamin D deficiency and multivitamins.

## 2025-05-01 NOTE — DISCUSSION/SUMMARY
[FreeTextEntry1] : He I have reviewed the MRI scan images of the brain t performed in [facility] in December 2024.  It does show extensive high intensity periventricular T2 signals consistent with massive microvascular ischemia as well as encephalomalacia in the right temporal lobe.  This combination itself could be responsible for her memory problems.  However, to investigate other potentially treatable forms of dementia , appropriate lab tests serology, PET scan and considered LP.  Because of her age her family may not wish her to have the LP.  Because of her risk of malignancy, and seizures complicating the story of loss of memory, EEG will be done.  I have reviewed the whole-body PET scan.  It does not show foci suspicious for malignancy. Recommend supplemental therapy for vitamin D deficiency and multivitamins.

## 2025-05-08 NOTE — DISCUSSION/SUMMARY
[EKG obtained to assist in diagnosis and management of assessed problem(s)] : EKG obtained to assist in diagnosis and management of assessed problem(s) [FreeTextEntry1] : In summary, Ms. Layton appears to be doing reasonably well from a cardiac standpoint.  She has been able to ambulate slowly and has not had any recent symptoms suggestive of angina or congestive heart failure.  In addition, Ms. Layton appears to remain minimally symptomatic from her chronic atrial fibrillation, which appears well rate controlled without the use of AV vivian blocking agents.  However, Ms. Layton has only been taking her Eliquis 2.5 mg once daily instead of the intended twice daily dosing.  Therefore, Ms. Layton was instructed to begin taking this medication twice daily.  In addition, Ms. Layton's blood pressure was noted to be elevated at today's office visit.  Her amlodipine dose will be increased from 5 mg/day to 10 mg/day.  Ms. Layton will return to the office for follow up in approximately three months, or sooner as necessary.

## 2025-05-08 NOTE — HISTORY OF PRESENT ILLNESS
[FreeTextEntry1] : Ms. Layton presents to the office today for a follow up visit.  Ms. Layton is a 85 year old female with a medical history significant for hypertension, hyperlipidemia, atrial fibrillation, diabetes mellitus, cerebrovascular accident (see below), colon polyps, peripheral vascular disease, cholelithiasis, esophageal candidiasis, and a gallbladder mass of uncertain etiology.  She has previously undergone a hysterectomy, lipoma resection, colonoscopy with polyp resection, and endoscopic ultrasound (EUS).  Ms. Layton underwent a colonoscopy on 11/6/2024.  Multiple colon polyps were found at the time of the examination.  Ms. Layton underwent an endoscopic ultrasound (EUS) on 9/24/2024.  This was performed for further evaluation of a PET scan that had demonstrated a new lesion abutting the gallbladder and ascending colon.  The PET scan had also demonstrated a severely dilated gallbladder with thickened walls and gallstones.  In summary, the EUS demonstrated multiple white adherent plaques throughout the esophagus consistent with esophageal candidiasis.  A small hiatus hernia was identified.  Mild erythema in the antrum and incisura.  The duodenal bulb and 2nd portion of the duodenum were normal.  There were multiple gallstones seen, measuring up to 15 mm in dimension.  The travon-gallbladder mass previously visualized on PET scan was not identified at the time of the EUS examination.  Ms. Layton was admitted to [facility] on December 31st, 2024 with episodes of confusion.  She was found to have a hemorrhagic stroke involving the right temporal/occipital region, likely involving the right posterior cerebral artery.  Ms. Layton was initially admitted to the ICU for monitoring and remained hemodynamically and neurologically stable.  Ms. Layton's aspirin and Eliquis were discontinued due to the hemorrhagic nature of the cerebrovascular accident.    On telemetry monitoring, Ms. Layton was found to have multiple episodes of atrial fibrillation in addition to episodes of asymptomatic bradycardia.  Neurology evaluated the patient and felt that the stroke was likely to be embolic in nature, leading to the recommendation that Eliquis be restarted in approximately 4-6 weeks (end of January 2025).  Ms. Layton was kept on aspirin with the plan to discontinue this medication once the Eliquis is restarted.  A transthoracic echocardiogram was performed during Ms. Layton's hospital admission (on 1/2/2025).  In summary, this demonstrated severe left ventricular hypertrophy.  Left ventricular systolic function is normal, with a visually estimated LVEF of 55-60%.  Right ventricular size and systolic function were normal.  There was minimal mitral regurgitation.  The aortic valve is trileaflet with normal opening.  There is mild aortic regurgitation.  The left atrium is severely dilated.  There is mild pulmonary hypertension, with an estimated pulmonary artery systolic pressure of 41 mmHg.  Ms. Layton reports that she has been feeling generally well since her last office visit.  She reports being able to walk several blocks, although she has been experiencing left foot pain from a nonhealing ulcer.  Ms. Layton experiences occasional exertional dyspnea but no dyspnea at rest.  There has been no chest pain either at rest or with exertion.  Ms. Layton denies having any palpitations, presyncope, or syncope.  There has been no orthopnea, paroxysmal nocturnal dyspnea, or edema.  Of note is that Ms. Layton has only been taking her Eliquis once per day instead of twice daily.  Ms. Layton reports that she recently underwent a percutaneous transcatheter vascular revascularization procedure for the left lower extremity.  This was apparently performed on 3/18/2025 at [facility].  However, no records or documentation from this procedure are currently available.  Ms. Layton reports that she receives physical therapy every Tuesday and Thursday.
